# Patient Record
Sex: OTHER/UNKNOWN | Race: WHITE | NOT HISPANIC OR LATINO | URBAN - METROPOLITAN AREA
[De-identification: names, ages, dates, MRNs, and addresses within clinical notes are randomized per-mention and may not be internally consistent; named-entity substitution may affect disease eponyms.]

---

## 2023-01-01 ENCOUNTER — HOSPITAL ENCOUNTER (EMERGENCY)
Facility: HOSPITAL | Age: 73
End: 2023-01-02
Attending: FAMILY MEDICINE

## 2023-01-01 VITALS — WEIGHT: 250 LBS | BODY MASS INDEX: 35.79 KG/M2 | HEIGHT: 70 IN

## 2023-01-01 DIAGNOSIS — I46.9 CARDIOPULMONARY ARREST: Primary | ICD-10-CM

## 2023-01-01 LAB — POCT GLUCOSE: 135 MG/DL (ref 70–110)

## 2023-01-01 PROCEDURE — 99900035 HC TECH TIME PER 15 MIN (STAT)

## 2023-01-01 PROCEDURE — 31500 INSERT EMERGENCY AIRWAY: CPT

## 2023-01-01 PROCEDURE — 63600175 PHARM REV CODE 636 W HCPCS

## 2023-01-01 PROCEDURE — 63600175 PHARM REV CODE 636 W HCPCS: Performed by: FAMILY MEDICINE

## 2023-01-01 PROCEDURE — 25000003 PHARM REV CODE 250

## 2023-01-01 PROCEDURE — 99285 EMERGENCY DEPT VISIT HI MDM: CPT | Mod: 25

## 2023-01-01 PROCEDURE — 25000003 PHARM REV CODE 250: Performed by: FAMILY MEDICINE

## 2023-01-01 PROCEDURE — 27000221 HC OXYGEN, UP TO 24 HOURS

## 2023-01-01 PROCEDURE — 99900026 HC AIRWAY MAINTENANCE (STAT)

## 2023-01-01 PROCEDURE — 92950 HEART/LUNG RESUSCITATION CPR: CPT

## 2023-01-01 RX ORDER — EPINEPHRINE 0.1 MG/ML
INJECTION INTRAVENOUS CODE/TRAUMA/SEDATION MEDICATION
Status: DISCONTINUED | OUTPATIENT
Start: 2023-01-01 | End: 2023-01-01 | Stop reason: HOSPADM

## 2023-01-01 RX ORDER — SODIUM BICARBONATE 1 MEQ/ML
SYRINGE (ML) INTRAVENOUS CODE/TRAUMA/SEDATION MEDICATION
Status: DISCONTINUED | OUTPATIENT
Start: 2023-01-01 | End: 2023-01-01 | Stop reason: HOSPADM

## 2023-01-01 RX ADMIN — EPINEPHRINE 1 MG: 0.1 INJECTION INTRACARDIAC; INTRAVENOUS at 11:01

## 2023-01-01 RX ADMIN — SODIUM BICARBONATE 50 MEQ: 84 INJECTION, SOLUTION INTRAVENOUS at 11:01

## 2023-01-02 NOTE — PLAN OF CARE
Patient arrived via [facility] apneic with loss of pulse with ACLS in progress. LMA in place. ACLS continued. LMA taken out and ETT 7.5 at 23 placed by Dr. Groves. CPR continued. Blood suctioned from ETT. Open wound present on bridge of nose. Patient . See. Code documentation.

## 2023-01-02 NOTE — ED PROVIDER NOTES
Encounter Date: 1/2/2023       History     Chief Complaint   Patient presents with    Cardiac Arrest     General illness x 3 weeks. Walking to stretcher with EMS and started c/o nausea then arrested with EMS.     73-year-old female presents brought in per EMS undergoing CPR, apparently patient's friend called 911 after the patient was complaining of some nausea and fatigue, she actually did stand and indicate with EMS nausea prior to collapsing in front of them at which time CPR was begun they did not regain a pulse a put in an airway adjunct, the patient had copious amounts of vomitus per EMS CPR was continued , patient was suctioned upon arrival and endotracheally intubated patient presented with no IV access, quick look of the cardiac activity revealed flat asystole with no electrical activity CPR was resumed, an IO was placed in the right humerus due to found edema both legs and inability to initiate peripheral IV, epinephrine was given initially down the ET tube, neurologic the patient was nonresponsive GCS of 3    Review of patient's allergies indicates:  No Known Allergies  History reviewed. No pertinent past medical history.  Past Surgical History:   Procedure Laterality Date    FOOT SURGERY       History reviewed. No pertinent family history.  Social History     Tobacco Use    Smoking status: Former     Types: Cigarettes    Smokeless tobacco: Never   Substance Use Topics    Alcohol use: Yes     Review of Systems   Unable to perform ROS: Other     Physical Exam     Initial Vitals [01/02/23 1113]   BP Pulse Resp Temp SpO2   (!) 0/0 (!) 0 (!) 0 -- --      MAP       --         Physical Exam    Nursing note and vitals reviewed.  Constitutional: Ebony Chavira appears well-developed and well-nourished. Ebony Chavira is not diaphoretic. No distress.   HENT:   Head: Normocephalic.   Superficial abrasion under the chin and in the nasal bridge area   Eyes: Right eye exhibits no discharge. Left eye exhibits  no discharge.   Pupils 6 mm and nonreactive bilaterally   Neck: No tracheal deviation present. No JVD present.   Pulmonary/Chest: No stridor. Ebony Chavira is in respiratory distress.   No respiratory effort   Musculoskeletal:         General: Normal range of motion.     Skin:   Skin is pale cyanotic and cool   Psychiatric: Ebony Chavira has a normal mood and affect.       ED Course   Intubation    Date/Time: 2023 12:57 PM  Location procedure was performed: [facility] EMERGENCY DEPARTMENT  Performed by: Tyler Groves MD  Authorized by: Tyler Groves MD   Consent Done: Emergent Situation  Indications: respiratory failure and airway protection  Intubation method: direct  Patient status: unconscious  Preoxygenation: BVM  Laryngoscope size: Mac 4  Tube size: 7.5 mm  Tube type: cuffed  Number of attempts: 1  Cricoid pressure: no  Post-procedure assessment: chest rise and CO2 detector  Breath sounds: clear  Cuff inflated: yes  ETT to lip: 23 cm  Tube secured with: ETT schulte  Complications: No  Specimens: No  Implants: No      Labs Reviewed   POCT GLUCOSE - Abnormal; Notable for the following components:       Result Value    POCT Glucose 135 (*)     All other components within normal limits          Imaging Results    None          Medications - No data to display    Medical Decision Making:   ED Management:  Despite effective CPR endotracheal and intraosseous epinephrine and bicarb no waveform developed patient remained in asystole and was pronounced dead at 11:26 a.m. having reached point of futility                        Clinical Impression:   Final diagnoses:  [I46.9] Cardiopulmonary arrest (Primary)        ED Disposition Condition                     Tyler Groves MD  23 [id number]       Tyler Groves MD  23 [id number]